# Patient Record
Sex: MALE | Race: BLACK OR AFRICAN AMERICAN | Employment: FULL TIME | URBAN - METROPOLITAN AREA
[De-identification: names, ages, dates, MRNs, and addresses within clinical notes are randomized per-mention and may not be internally consistent; named-entity substitution may affect disease eponyms.]

---

## 2018-02-28 ENCOUNTER — OFFICE VISIT (OUTPATIENT)
Dept: SURGERY | Facility: CLINIC | Age: 53
End: 2018-02-28

## 2018-02-28 VITALS
BODY MASS INDEX: 25.4 KG/M2 | DIASTOLIC BLOOD PRESSURE: 92 MMHG | HEIGHT: 76 IN | SYSTOLIC BLOOD PRESSURE: 145 MMHG | WEIGHT: 208.63 LBS | HEART RATE: 75 BPM | OXYGEN SATURATION: 100 %

## 2018-02-28 DIAGNOSIS — D17.0 LIPOMA OF FOREHEAD: Primary | ICD-10-CM

## 2018-02-28 PROCEDURE — 99243 OFF/OP CNSLTJ NEW/EST LOW 30: CPT | Performed by: SURGERY

## 2018-02-28 RX ORDER — IBUPROFEN 400 MG/1
400 TABLET ORAL EVERY 6 HOURS PRN
COMMUNITY

## 2018-02-28 RX ORDER — MULTIVIT-MIN/IRON FUM/FOLIC AC 7.5 MG-4
1 TABLET ORAL DAILY
COMMUNITY

## 2018-02-28 NOTE — CONSULTS
New Patient Consultation    Chief Complaint: forehead lipoma    History of Present Illness:   Surinder Finn is a 50year old male referred by Dr. Juan Jose Witt for excision of forehead lipoma.  Pt reports pain with pressure and growing in size of the soft tissue pain/pressure, palpitations, irregular heartbeat, high blood pressure, stroke, or leg swelling  Breasts:  Patient denies breast masses, pain, change in the breast skin, skin dimpling, nipple discharge, or rash  Gastrointestinal:   There is no history of di and pink  Forehead, 2.3cm mobile soft tissue mass R forehead    Integument/Skin: The skin appears normal. There are no suspicious appearing rashes or lesions. Respiratory: Normal respiratory effort.      Cardiovascular: no cyanosis, no edema    Musculosk

## 2018-02-28 NOTE — PROGRESS NOTES
New consult: Lipoma of forehead. Pt given pre-op instructions for surgery and pt will decide on surgery and give us a call. Informed consent for the procedure reviewed and signed by the patient in the office, all questions answered.  The patient was provi

## 2018-03-19 ENCOUNTER — TELEPHONE (OUTPATIENT)
Dept: SURGERY | Facility: CLINIC | Age: 53
End: 2018-03-19

## 2018-03-19 NOTE — TELEPHONE ENCOUNTER
The patient called after considering his options. He would like to proceed with excision of forehead lipoma under local anesthesia as discussed with Dr. Richar Salazar at the consultation visit.  All surgery instructions and consent form will be emailed to the patie

## 2018-03-20 ENCOUNTER — LAB ENCOUNTER (OUTPATIENT)
Dept: LAB | Facility: HOSPITAL | Age: 53
End: 2018-03-20
Payer: COMMERCIAL

## 2018-03-20 DIAGNOSIS — E78.6 HYPOLIPIDEMIA: Primary | ICD-10-CM

## 2018-03-20 LAB
ALBUMIN SERPL-MCNC: 3.6 G/DL (ref 3.5–4.8)
ALP LIVER SERPL-CCNC: 78 U/L (ref 45–117)
ALT SERPL-CCNC: 45 U/L (ref 17–63)
AST SERPL-CCNC: 30 U/L (ref 15–41)
BILIRUB SERPL-MCNC: 0.5 MG/DL (ref 0.1–2)
BUN BLD-MCNC: 22 MG/DL (ref 8–20)
CALCIUM BLD-MCNC: 8.6 MG/DL (ref 8.3–10.3)
CHLORIDE: 108 MMOL/L (ref 101–111)
CO2: 25 MMOL/L (ref 22–32)
CREAT BLD-MCNC: 1.38 MG/DL (ref 0.7–1.3)
EST. AVERAGE GLUCOSE BLD GHB EST-MCNC: 134 MG/DL (ref 68–126)
GLUCOSE BLD-MCNC: 119 MG/DL (ref 70–99)
HBA1C MFR BLD HPLC: 6.3 % (ref ?–5.7)
M PROTEIN MFR SERPL ELPH: 7.1 G/DL (ref 6.1–8.3)
POTASSIUM SERPL-SCNC: 4.3 MMOL/L (ref 3.6–5.1)
SODIUM SERPL-SCNC: 141 MMOL/L (ref 136–144)

## 2018-03-20 PROCEDURE — 83036 HEMOGLOBIN GLYCOSYLATED A1C: CPT

## 2018-03-20 PROCEDURE — 36415 COLL VENOUS BLD VENIPUNCTURE: CPT

## 2018-03-20 PROCEDURE — 80053 COMPREHEN METABOLIC PANEL: CPT

## 2018-03-29 ENCOUNTER — HOSPITAL ENCOUNTER (OUTPATIENT)
Facility: HOSPITAL | Age: 53
Setting detail: HOSPITAL OUTPATIENT SURGERY
Discharge: HOME | End: 2018-03-29
Attending: SURGERY | Admitting: SURGERY
Payer: COMMERCIAL

## 2018-03-29 ENCOUNTER — SURGERY (OUTPATIENT)
Age: 53
End: 2018-03-29

## 2018-03-29 VITALS
SYSTOLIC BLOOD PRESSURE: 128 MMHG | WEIGHT: 201 LBS | OXYGEN SATURATION: 100 % | HEIGHT: 76 IN | BODY MASS INDEX: 24.48 KG/M2 | RESPIRATION RATE: 16 BRPM | DIASTOLIC BLOOD PRESSURE: 81 MMHG | TEMPERATURE: 98 F | HEART RATE: 63 BPM

## 2018-03-29 DIAGNOSIS — D17.0 LIPOMA OF FOREHEAD: ICD-10-CM

## 2018-03-29 PROCEDURE — 0JB10ZZ EXCISION OF FACE SUBCUTANEOUS TISSUE AND FASCIA, OPEN APPROACH: ICD-10-PCS | Performed by: SURGERY

## 2018-03-29 PROCEDURE — 0KB10ZZ EXCISION OF FACIAL MUSCLE, OPEN APPROACH: ICD-10-PCS | Performed by: SURGERY

## 2018-03-29 PROCEDURE — 88304 TISSUE EXAM BY PATHOLOGIST: CPT | Performed by: SURGERY

## 2018-03-29 RX ORDER — LIDOCAINE HYDROCHLORIDE AND EPINEPHRINE 10; 10 MG/ML; UG/ML
INJECTION, SOLUTION INFILTRATION; PERINEURAL AS NEEDED
Status: DISCONTINUED | OUTPATIENT
Start: 2018-03-29 | End: 2018-03-29 | Stop reason: HOSPADM

## 2018-03-29 NOTE — BRIEF OP NOTE
Pre-Operative Diagnosis: Lipoma of forehead [D17.0]     Post-Operative Diagnosis: Lipoma of forehead [D17.0]      Procedure Performed:   Procedure(s):  Excision of forehead lipoma with complex wound closure    Surgeon(s) and Role:     Georgie Aldridge MD

## 2018-03-30 NOTE — OPERATIVE REPORT
[address]    PATIENT'S NAME: Mendel Punches   ATTENDING PHYSICIAN: Geeta Figueroa MD   OPERATING PHYSICIAN: Geeta Figueroa MD   PATIENT ACCOUNT#:   [id number]    LOCATION:  [place] [address] [address]W 10  MEDICAL RECORD #:   [id number]       DATE OF right on the periosteum. This was then carefully removed in its entirety, including the capsule with a Hooper nerve dissector and Metzenbaum scissors. It was sent to Pathology.   Then the wound was irrigated and the small slit at the frontalis muscle was

## 2018-04-11 ENCOUNTER — OFFICE VISIT (OUTPATIENT)
Dept: SURGERY | Facility: CLINIC | Age: 53
End: 2018-04-11

## 2018-04-11 DIAGNOSIS — D17.0 LIPOMA OF FOREHEAD: Primary | ICD-10-CM

## 2018-04-11 PROCEDURE — 99024 POSTOP FOLLOW-UP VISIT: CPT | Performed by: SURGERY

## 2018-04-11 RX ORDER — METFORMIN HYDROCHLORIDE 750 MG/1
750 TABLET, EXTENDED RELEASE ORAL
COMMUNITY

## 2018-04-11 NOTE — PROGRESS NOTES
Jerry Gifford is a 50year old male who presents today for a follow-up after lipoma excision submuscular forehead       He denies fever and chills. He denies nausea, vomiting, diarrhea or constipation. His pain is controlled.         Physical Exam     [place]

## 2018-07-27 ENCOUNTER — LAB ENCOUNTER (OUTPATIENT)
Dept: LAB | Facility: HOSPITAL | Age: 53
End: 2018-07-27
Attending: NURSE PRACTITIONER
Payer: COMMERCIAL

## 2018-07-27 DIAGNOSIS — E78.5 HYPERLIPIDEMIA: Primary | ICD-10-CM

## 2018-07-27 LAB
ALBUMIN SERPL-MCNC: 3.8 G/DL (ref 3.5–4.8)
ALBUMIN/GLOB SERPL: 1.1 {RATIO} (ref 1–2)
ALP LIVER SERPL-CCNC: 70 U/L (ref 45–117)
ALT SERPL-CCNC: 45 U/L (ref 17–63)
ANION GAP SERPL CALC-SCNC: 8 MMOL/L (ref 0–18)
AST SERPL-CCNC: 25 U/L (ref 15–41)
BILIRUB SERPL-MCNC: 0.4 MG/DL (ref 0.1–2)
BUN BLD-MCNC: 18 MG/DL (ref 8–20)
BUN/CREAT SERPL: 12.3 (ref 10–20)
CALCIUM BLD-MCNC: 9.8 MG/DL (ref 8.3–10.3)
CHLORIDE SERPL-SCNC: 107 MMOL/L (ref 101–111)
CO2 SERPL-SCNC: 30 MMOL/L (ref 22–32)
CREAT BLD-MCNC: 1.46 MG/DL (ref 0.7–1.3)
EST. AVERAGE GLUCOSE BLD GHB EST-MCNC: 131 MG/DL (ref 68–126)
GLOBULIN PLAS-MCNC: 3.4 G/DL (ref 2.5–3.7)
GLUCOSE BLD-MCNC: 111 MG/DL (ref 70–99)
HBA1C MFR BLD HPLC: 6.2 % (ref ?–5.7)
M PROTEIN MFR SERPL ELPH: 7.2 G/DL (ref 6.1–8.3)
OSMOLALITY SERPL CALC.SUM OF ELEC: 303 MOSM/KG (ref 275–295)
POTASSIUM SERPL-SCNC: 4.5 MMOL/L (ref 3.6–5.1)
SODIUM SERPL-SCNC: 145 MMOL/L (ref 136–144)

## 2018-07-27 PROCEDURE — 36415 COLL VENOUS BLD VENIPUNCTURE: CPT

## 2018-07-27 PROCEDURE — 83036 HEMOGLOBIN GLYCOSYLATED A1C: CPT

## 2018-07-27 PROCEDURE — 80053 COMPREHEN METABOLIC PANEL: CPT

## 2018-10-24 PROBLEM — M75.42 IMPINGEMENT SYNDROME OF SHOULDER, LEFT: Status: ACTIVE | Noted: 2018-10-24

## 2019-01-31 PROBLEM — M25.612 DECREASED ROM OF LEFT SHOULDER: Status: ACTIVE | Noted: 2019-01-31

## 2019-01-31 PROBLEM — M25.512 CHRONIC LEFT SHOULDER PAIN: Status: ACTIVE | Noted: 2019-01-31

## 2019-01-31 PROBLEM — G89.29 CHRONIC LEFT SHOULDER PAIN: Status: ACTIVE | Noted: 2019-01-31

## 2019-10-08 ENCOUNTER — TELEPHONE (OUTPATIENT)
Dept: SURGERY | Facility: CLINIC | Age: 54
End: 2019-10-08
